# Patient Record
Sex: MALE | Race: WHITE | NOT HISPANIC OR LATINO | Employment: FULL TIME | URBAN - NONMETROPOLITAN AREA
[De-identification: names, ages, dates, MRNs, and addresses within clinical notes are randomized per-mention and may not be internally consistent; named-entity substitution may affect disease eponyms.]

---

## 2019-08-09 ENCOUNTER — OFFICE VISIT (OUTPATIENT)
Dept: ORTHOPEDIC SURGERY | Facility: CLINIC | Age: 44
End: 2019-08-09

## 2019-08-09 VITALS — BODY MASS INDEX: 26.12 KG/M2 | WEIGHT: 162.5 LBS | HEIGHT: 66 IN

## 2019-08-09 DIAGNOSIS — M25.472 LEFT ANKLE SWELLING: ICD-10-CM

## 2019-08-09 DIAGNOSIS — M25.572 ACUTE LEFT ANKLE PAIN: Primary | ICD-10-CM

## 2019-08-09 PROBLEM — M89.8X6 PAIN IN LEFT TIBIA: Status: ACTIVE | Noted: 2019-08-09

## 2019-08-09 PROCEDURE — 99203 OFFICE O/P NEW LOW 30 MIN: CPT | Performed by: NURSE PRACTITIONER

## 2019-08-09 RX ORDER — HYDROCODONE BITARTRATE AND ACETAMINOPHEN 10; 325 MG/1; MG/1
1 TABLET ORAL EVERY 6 HOURS PRN
COMMUNITY

## 2019-08-09 NOTE — PROGRESS NOTES
Devin Agarwal is a 44 y.o. male   Primary provider:  Provider, No Known       Chief Complaint   Patient presents with   • Left Tibia - Pain       HISTORY OF PRESENT ILLNESS:      44-year-old  male in the office today for what he reports is a nontraumatic left ankle pain which started last week.  He reports areas of redness swelling and pain in the anterior part of the ankle and foot without any traumatic injury.  He was evaluated at a local urgent care and referred to us after he was placed in Ortho boot and told to modify his activity, ice and elevate.  Since then his pain has minimally improved.  He is not able to perform his work duties as well as his activities of daily living without some difficulty.  He currently denies any fever chills or evidence of infection.    Pain   This is a new problem. The current episode started in the past 7 days. The problem occurs constantly. Associated symptoms comments: Stabbing aching burning clicking popping snapping . The symptoms are aggravated by standing and walking. He has tried nothing for the symptoms. The treatment provided no relief.        CONCURRENT MEDICAL HISTORY:    History reviewed. No pertinent past medical history.    No Known Allergies      Current Outpatient Medications:   •  HYDROcodone-acetaminophen (NORCO)  MG per tablet, Take 1 tablet by mouth Every 6 (Six) Hours As Needed for Moderate Pain ., Disp: , Rfl:     History reviewed. No pertinent surgical history.    History reviewed. No pertinent family history.    Social History     Socioeconomic History   • Marital status:      Spouse name: Not on file   • Number of children: Not on file   • Years of education: Not on file   • Highest education level: Not on file   Tobacco Use   • Smoking status: Never Smoker   • Smokeless tobacco: Never Used   Substance and Sexual Activity   • Alcohol use: Yes     Comment: few times a month   • Drug use: No   • Sexual activity: Defer        Review  "of Systems   Musculoskeletal:        Left ankle pain     All other systems reviewed and are negative.      PHYSICAL EXAMINATION:       Ht 167.6 cm (66\")   Wt 73.7 kg (162 lb 8 oz)   BMI 26.23 kg/m²     Physical Exam   Constitutional: He is oriented to person, place, and time. Vital signs are normal. He appears well-developed and well-nourished. He is cooperative.   HENT:   Head: Normocephalic and atraumatic.   Neck: Trachea normal and phonation normal.   Pulmonary/Chest: Effort normal. No respiratory distress.   Abdominal: Soft. Normal appearance. He exhibits no distension.   Neurological: He is alert and oriented to person, place, and time. GCS eye subscore is 4. GCS verbal subscore is 5. GCS motor subscore is 6.   Skin: Skin is warm, dry and intact. Capillary refill takes less than 2 seconds.   Psychiatric: He has a normal mood and affect. His speech is normal and behavior is normal. Judgment and thought content normal. Cognition and memory are normal.   Vitals reviewed.      GAIT:     []  Normal  [x]  Antalgic    Assistive device: []  None  []  Walker     []  Crutches  []  Cane     []  Wheelchair  []  Stretcher    Right Ankle Exam   Right ankle exam is normal.      Left Ankle Exam     Tenderness   Left ankle tenderness location: Diffuse tenderness in the ankle joint itself that tracks down into the talonavicular joint and the anterior midfoot.   Swelling: mild    Range of Motion   Dorsiflexion: normal   Plantar flexion: normal   Eversion: normal   Inversion: normal     Muscle Strength   Dorsiflexion:  4/5   Plantar flexion:  4/5     Other   Erythema: absent  Scars: present (From previous ORIF of the tibia)  Sensation: normal  Pulse: present    Comments:  No evidence of erythema and/or acute infection                  No results found.        ASSESSMENT:    Diagnoses and all orders for this visit:    Acute left ankle pain  -     MRI Ankle Left Without Contrast  -     Sedimentation Rate; Future  -     " "Comprehensive Metabolic Panel; Future  -     CBC & Differential; Future  -     C-reactive Protein; Future  -     Uric Acid; Future    Left ankle swelling    Other orders  -     HYDROcodone-acetaminophen (NORCO)  MG per tablet; Take 1 tablet by mouth Every 6 (Six) Hours As Needed for Moderate Pain .          PLAN    I reviewed the x-rays with the patient from a local urgent care which reports an acute injury to the distal end of the anterior surface of the tibia however the patient denies any recent traumatic injury.  This may space sequela from an old injury however it does not explain the diffuse swelling and erythema that he noted earlier this week.  I also suggested the possibility of this could be gout and I ordered labs however the patient does not want to proceed with any lab testing since he is \"scared of needles\".  I recommended that he remain in the Ortho boot in the meantime and he was provided with a set of crutches to modify his weightbearing.  I have ordered an MRI of the ankle to rule out an acute fracture/infection/tendon injury of the left ankle.  Patient verbalized understanding was given information to contact the office for follow-up if he has not heard anything within 7 days for the MRI.  He was also instructed to contact the office for worsening symptoms as well.  He verbalized understanding of treatment plan and to follow-up    YAMILKA Lopez    "

## 2019-08-14 ENCOUNTER — HOSPITAL ENCOUNTER (OUTPATIENT)
Dept: MRI IMAGING | Facility: HOSPITAL | Age: 44
Discharge: HOME | End: 2019-08-14
Admitting: NURSE PRACTITIONER

## 2019-08-14 PROCEDURE — 73721 MRI JNT OF LWR EXTRE W/O DYE: CPT

## 2021-10-14 DIAGNOSIS — I10 BENIGN HYPERTENSION: Primary | ICD-10-CM

## 2021-10-14 RX ORDER — LISINOPRIL 10 MG/1
10 TABLET ORAL DAILY
Qty: 30 TABLET | Refills: 0 | Status: SHIPPED | OUTPATIENT
Start: 2021-10-14